# Patient Record
Sex: MALE | Race: WHITE
[De-identification: names, ages, dates, MRNs, and addresses within clinical notes are randomized per-mention and may not be internally consistent; named-entity substitution may affect disease eponyms.]

---

## 2018-02-09 ENCOUNTER — HOSPITAL ENCOUNTER (OUTPATIENT)
Dept: HOSPITAL 45 - C.RAD1850 | Age: 48
Discharge: HOME | End: 2018-02-09
Attending: INTERNAL MEDICINE
Payer: COMMERCIAL

## 2018-02-09 DIAGNOSIS — R06.09: Primary | ICD-10-CM

## 2018-02-09 DIAGNOSIS — M25.562: ICD-10-CM

## 2018-02-09 DIAGNOSIS — M25.561: ICD-10-CM

## 2018-02-09 NOTE — DIAGNOSTIC IMAGING REPORT
CHEST 2 VIEWS ROUTINE



CLINICAL HISTORY: 48 years-old Male presenting with DYSPNEA ON EXERTION. 



TECHNIQUE: PA and lateral views of the chest were obtained.



COMPARISON: None.



FINDINGS:

Cardiomediastinal silhouette normal. Minimal left basilar bandlike opacity.

Mildly low lung volumes with hypoventilatory changes. No other focal infiltrate.

No pleural effusion or pneumothorax. Osseous structures normal. Upper abdomen

normal.



IMPRESSION:

1.  Minimal left basilar atelectasis or scarring. Mildly low lung volumes with

hypoventilatory changes.







Electronically signed by:  Santosh Pineda M.D.

2/9/2018 5:15 PM



Dictated Date/Time:  2/9/2018 5:14 PM

## 2018-02-09 NOTE — DIAGNOSTIC IMAGING REPORT
L KNEE 1 OR 2 VIEWS ROUTINE, R KNEE 1 OR 2 VIEWS ROUTINE



CLINICAL HISTORY: 48 years-old Male presenting with KNEE PAIN. 



TECHNIQUE: Frontal, lateral, and sunrise views of the  right and left knees were

obtained.



COMPARISON: None.



FINDINGS:

Right knee:

No acute fracture or malalignment. No advanced degenerative change. No

radiographic soft tissue abnormality.



Left knee:

No acute fracture or malalignment. No advanced degenerative change. No

radiographic soft tissue abnormality. 



IMPRESSION:

1.  No acute osseous injury of the right or left knee.







Electronically signed by:  Santosh Pineda M.D.

2/9/2018 5:17 PM



Dictated Date/Time:  2/9/2018 5:15 PM

## 2018-03-01 ENCOUNTER — HOSPITAL ENCOUNTER (OUTPATIENT)
Dept: HOSPITAL 45 - C.LAB1850 | Age: 48
Discharge: HOME | End: 2018-03-01
Attending: INTERNAL MEDICINE
Payer: COMMERCIAL

## 2018-03-01 DIAGNOSIS — R10.9: Primary | ICD-10-CM

## 2018-03-01 DIAGNOSIS — E78.5: ICD-10-CM

## 2018-03-01 LAB
ALBUMIN SERPL-MCNC: 3.7 GM/DL (ref 3.4–5)
ALP SERPL-CCNC: 73 U/L (ref 45–117)
ALT SERPL-CCNC: 57 U/L (ref 12–78)
AST SERPL-CCNC: 23 U/L (ref 15–37)
BASOPHILS # BLD: 0.12 K/UL (ref 0–0.2)
BASOPHILS NFR BLD: 1.7 %
BUN SERPL-MCNC: 18 MG/DL (ref 7–18)
CALCIUM SERPL-MCNC: 8.8 MG/DL (ref 8.5–10.1)
CO2 SERPL-SCNC: 22 MMOL/L (ref 21–32)
CREAT SERPL-MCNC: 0.84 MG/DL (ref 0.6–1.4)
EOS ABS #: 0.5 K/UL (ref 0–0.5)
EOSINOPHIL NFR BLD AUTO: 270 K/UL (ref 130–400)
GLUCOSE SERPL-MCNC: 94 MG/DL (ref 70–99)
HCT VFR BLD CALC: 46.4 % (ref 42–52)
HGB BLD-MCNC: 16.1 G/DL (ref 14–18)
IG#: 0.02 K/UL (ref 0–0.02)
IMM GRANULOCYTES NFR BLD AUTO: 30.4 %
KETONES UR QL STRIP: 197 MG/DL
LYMPHOCYTES # BLD: 2.14 K/UL (ref 1.2–3.4)
MCH RBC QN AUTO: 33.1 PG (ref 25–34)
MCHC RBC AUTO-ENTMCNC: 34.7 G/DL (ref 32–36)
MCV RBC AUTO: 95.5 FL (ref 80–100)
MONO ABS #: 0.73 K/UL (ref 0.11–0.59)
MONOCYTES NFR BLD: 10.4 %
NEUT ABS #: 3.52 K/UL (ref 1.4–6.5)
NEUTROPHILS # BLD AUTO: 7.1 %
NEUTROPHILS NFR BLD AUTO: 50.1 %
PH UR: 284 MG/DL (ref 0–200)
PMV BLD AUTO: 8.9 FL (ref 7.4–10.4)
POTASSIUM SERPL-SCNC: 4.3 MMOL/L (ref 3.5–5.1)
PROT SERPL-MCNC: 7.6 GM/DL (ref 6.4–8.2)
RED CELL DISTRIBUTION WIDTH CV: 12.7 % (ref 11.5–14.5)
RED CELL DISTRIBUTION WIDTH SD: 44.2 FL (ref 36.4–46.3)
SODIUM SERPL-SCNC: 138 MMOL/L (ref 136–145)
WBC # BLD AUTO: 7.03 K/UL (ref 4.8–10.8)